# Patient Record
Sex: FEMALE | Race: WHITE | NOT HISPANIC OR LATINO | Employment: FULL TIME | URBAN - METROPOLITAN AREA
[De-identification: names, ages, dates, MRNs, and addresses within clinical notes are randomized per-mention and may not be internally consistent; named-entity substitution may affect disease eponyms.]

---

## 2017-01-15 ENCOUNTER — HOSPITAL ENCOUNTER (EMERGENCY)
Facility: HOSPITAL | Age: 37
Discharge: HOME | End: 2017-01-15
Attending: EMERGENCY MEDICINE | Admitting: EMERGENCY MEDICINE

## 2017-01-15 ENCOUNTER — APPOINTMENT (OUTPATIENT)
Dept: GENERAL RADIOLOGY | Facility: HOSPITAL | Age: 37
End: 2017-01-15

## 2017-01-15 VITALS
HEIGHT: 60 IN | HEART RATE: 70 BPM | RESPIRATION RATE: 18 BRPM | BODY MASS INDEX: 31.41 KG/M2 | TEMPERATURE: 98.1 F | WEIGHT: 160 LBS | DIASTOLIC BLOOD PRESSURE: 70 MMHG | SYSTOLIC BLOOD PRESSURE: 116 MMHG | OXYGEN SATURATION: 98 %

## 2017-01-15 DIAGNOSIS — S80.02XA CONTUSION OF LEFT KNEE, INITIAL ENCOUNTER: Primary | ICD-10-CM

## 2017-01-15 PROCEDURE — 99283 EMERGENCY DEPT VISIT LOW MDM: CPT

## 2017-01-15 PROCEDURE — 99283 EMERGENCY DEPT VISIT LOW MDM: CPT | Performed by: EMERGENCY MEDICINE

## 2017-01-15 PROCEDURE — 73562 X-RAY EXAM OF KNEE 3: CPT

## 2017-01-15 RX ORDER — HYDROCODONE BITARTRATE AND ACETAMINOPHEN 5; 325 MG/1; MG/1
TABLET ORAL
Qty: 10 TABLET | Refills: 0 | Status: SHIPPED | OUTPATIENT
Start: 2017-01-15 | End: 2017-10-26

## 2017-01-15 RX ORDER — IBUPROFEN 800 MG/1
800 TABLET ORAL EVERY 6 HOURS PRN
COMMUNITY
End: 2017-10-26

## 2017-01-15 NOTE — DISCHARGE INSTRUCTIONS
Return to the emergency department with worsening symptoms, uncontrolled pain, inability to tolerate oral liquids, fever greater than 105°F not controlled by Tylenol and ibuprofen or as needed with emergent concerns.

## 2017-01-15 NOTE — ED PROVIDER NOTES
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  She reports that her left knee struck the ground.  No direct blunt force trauma otherwise.    Review of Systems  All systems reviewed and are otherwise negative as release chief complaint  Past Medical History   Diagnosis Date   • Migraine    • Strep throat        Allergies   Allergen Reactions   • Imitrex [Sumatriptan] Shortness Of Breath   • Latex Hives       Past Surgical History   Procedure Laterality Date   •  section     • Tubal abdominal ligation         Family History   Problem Relation Age of Onset   • COPD Maternal Grandmother    • Heart attack Maternal Grandfather        Social History     Social History   • Marital status:      Spouse name: N/A   • Number of children: N/A   • Years of education: N/A     Social History Main Topics   • Smoking status: Never Smoker   • Smokeless tobacco: None   • Alcohol use Yes      Comment: socially   • Drug use: None   • Sexual activity: Yes     Partners: Male      Comment: tubal ligation      Other Topics Concern   • None     Social History Narrative   • None       ED Triage Vitals   Temp Heart Rate Resp BP SpO2   01/15/17 1217 01/15/17 1217 01/15/17 1217 01/15/17 1217 01/15/17 1217   98.1 °F (36.7 °C) 70 18 116/70 98 %      Temp src Heart Rate Source Patient Position BP Location FiO2 (%)   01/15/17 1217 -- 01/15/17 1217 01/15/17 1217 --   Oral  Sitting Right arm      Objective   Physical Exam   Constitutional: She is oriented to person, place, and time. She appears well-developed. No distress.   HENT:   Head: Normocephalic and atraumatic.   Neck:    Painless movement   Cardiovascular: Normal rate and regular rhythm.    Pulmonary/Chest: Effort normal and breath sounds normal. No respiratory distress.   Musculoskeletal:        Legs:  MAEE, normal strength   Neurological: She is alert and oriented to person, place, and time.   Skin: Skin is warm and dry.   Psychiatric: She has a normal mood and affect. Thought content normal.   Nursing note and vitals reviewed.      Procedures  RADIOLOGY        Study: XR Left KNee    Findings: no fx or dislocation    Interpreted Contemporaneously by myself, independently viewed by me.    SPLINT: ACE to left knee. Neurovascularly unchanged after.       ED Course  ED Course                MDM  Number of Diagnoses or Management Options  Contusion of left knee, initial encounter:      Amount and/or Complexity of Data Reviewed  Tests in the radiology section of CPT®: ordered and reviewed  Independent visualization of images, tracings, or specimens: (MASON)      Mason query complete. Treatment plan to include limited course of prescribed controlled substance.  Risks including addiction, tolerance, sedation, benefits and alternatives presented to patient.  Final diagnoses:   Contusion of left knee, initial encounter              Medication List      New Prescriptions          HYDROcodone-acetaminophen 5-325 MG per tablet   Commonly known as:  NORCO   Take 1 tab by mouth every 4-6 hours as needed for pain         Stop          predniSONE 10 MG tablet   Commonly known as:  OTILIA Ricci MD  01/15/17 6559

## 2017-01-15 NOTE — ED NOTES
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  C/o left leg/knee pain     Sina Mtz RN  01/15/17 1518

## 2017-04-12 ENCOUNTER — APPOINTMENT (OUTPATIENT)
Dept: GENERAL RADIOLOGY | Facility: HOSPITAL | Age: 37
End: 2017-04-12

## 2017-04-12 ENCOUNTER — HOSPITAL ENCOUNTER (EMERGENCY)
Facility: HOSPITAL | Age: 37
Discharge: HOME | End: 2017-04-12
Attending: EMERGENCY MEDICINE | Admitting: EMERGENCY MEDICINE

## 2017-04-12 VITALS
DIASTOLIC BLOOD PRESSURE: 88 MMHG | OXYGEN SATURATION: 100 % | RESPIRATION RATE: 16 BRPM | SYSTOLIC BLOOD PRESSURE: 128 MMHG | TEMPERATURE: 98.1 F | HEART RATE: 64 BPM | HEIGHT: 61 IN | WEIGHT: 165 LBS | BODY MASS INDEX: 31.15 KG/M2

## 2017-04-12 DIAGNOSIS — S20.229A CONTUSION, BACK, UNSPECIFIED LATERALITY, INITIAL ENCOUNTER: Primary | ICD-10-CM

## 2017-04-12 PROCEDURE — 72072 X-RAY EXAM THORAC SPINE 3VWS: CPT

## 2017-04-12 PROCEDURE — 99283 EMERGENCY DEPT VISIT LOW MDM: CPT

## 2017-04-12 PROCEDURE — 72100 X-RAY EXAM L-S SPINE 2/3 VWS: CPT

## 2017-04-12 PROCEDURE — 99283 EMERGENCY DEPT VISIT LOW MDM: CPT | Performed by: EMERGENCY MEDICINE

## 2017-04-12 PROCEDURE — 25010000002 KETOROLAC TROMETHAMINE PER 15 MG: Performed by: EMERGENCY MEDICINE

## 2017-04-12 PROCEDURE — 96372 THER/PROPH/DIAG INJ SC/IM: CPT

## 2017-04-12 RX ORDER — KETOROLAC TROMETHAMINE 30 MG/ML
60 INJECTION, SOLUTION INTRAMUSCULAR; INTRAVENOUS ONCE
Status: COMPLETED | OUTPATIENT
Start: 2017-04-12 | End: 2017-04-12

## 2017-04-12 RX ORDER — METHOCARBAMOL 750 MG/1
750 TABLET, FILM COATED ORAL 3 TIMES DAILY PRN
Qty: 42 TABLET | Refills: 0 | Status: SHIPPED | OUTPATIENT
Start: 2017-04-12 | End: 2017-04-19

## 2017-04-12 RX ORDER — NABUMETONE 750 MG/1
750 TABLET, FILM COATED ORAL 2 TIMES DAILY PRN
Qty: 14 TABLET | Refills: 0 | Status: SHIPPED | OUTPATIENT
Start: 2017-04-12 | End: 2017-04-19

## 2017-04-12 RX ADMIN — KETOROLAC TROMETHAMINE 60 MG: 30 INJECTION, SOLUTION INTRAMUSCULAR at 21:57
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
## 2017-04-13 NOTE — ED NOTES
Apologized to pt for the long wait. 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     Chai Marroquin RN  04/12/17 2078

## 2017-06-30 ENCOUNTER — HOSPITAL ENCOUNTER (EMERGENCY)
Facility: HOSPITAL | Age: 37
Discharge: HOME | End: 2017-06-30
Attending: EMERGENCY MEDICINE | Admitting: EMERGENCY MEDICINE

## 2017-06-30 VITALS
OXYGEN SATURATION: 100 % | BODY MASS INDEX: 32.98 KG/M2 | HEART RATE: 74 BPM | RESPIRATION RATE: 16 BRPM | HEIGHT: 60 IN | TEMPERATURE: 98.5 F | DIASTOLIC BLOOD PRESSURE: 89 MMHG | WEIGHT: 168 LBS | SYSTOLIC BLOOD PRESSURE: 133 MMHG

## 2017-06-30 DIAGNOSIS — S70.361A INSECT BITE (NONVENOMOUS), RIGHT THIGH, INITIAL ENCOUNTER: Primary | ICD-10-CM

## 2017-06-30 DIAGNOSIS — L03.115 CELLULITIS OF RIGHT LOWER EXTREMITY: ICD-10-CM
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
## 2017-06-30 PROCEDURE — 99282 EMERGENCY DEPT VISIT SF MDM: CPT | Performed by: EMERGENCY MEDICINE

## 2017-06-30 PROCEDURE — 99283 EMERGENCY DEPT VISIT LOW MDM: CPT

## 2017-06-30 RX ORDER — NAPROXEN 500 MG/1
500 TABLET ORAL 2 TIMES DAILY PRN
Qty: 30 TABLET | Refills: 0 | Status: SHIPPED | OUTPATIENT
Start: 2017-06-30 | End: 2017-10-26

## 2017-06-30 RX ORDER — NAPROXEN 250 MG/1
500 TABLET ORAL ONCE
Status: COMPLETED | OUTPATIENT
Start: 2017-06-30 | End: 2017-06-30

## 2017-06-30 RX ORDER — SULFAMETHOXAZOLE AND TRIMETHOPRIM 800; 160 MG/1; MG/1
1 TABLET ORAL EVERY 12 HOURS SCHEDULED
Status: DISCONTINUED | OUTPATIENT
Start: 2017-06-30 | End: 2017-07-01 | Stop reason: HOSPADM

## 2017-06-30 RX ORDER — SULFAMETHOXAZOLE AND TRIMETHOPRIM 800; 160 MG/1; MG/1
1 TABLET ORAL 2 TIMES DAILY
Qty: 20 TABLET | Refills: 0 | Status: SHIPPED | OUTPATIENT
Start: 2017-06-30 | End: 2017-10-26

## 2017-06-30 RX ADMIN — NAPROXEN 500 MG: 250 TABLET ORAL at 23:33

## 2017-06-30 RX ADMIN — SULFAMETHOXAZOLE AND TRIMETHOPRIM 160 MG: 800; 160 TABLET ORAL at 23:32

## 2017-07-01 NOTE — DISCHARGE INSTRUCTIONS
Ice right eye for 20 minutes every 2 hours while you're awake for 2-3 days.  Follow-up with Dr. kamara for recheck on Monday.  Return to emergency department if you have increasing pain, redness, fever, numbness, tingling, weakness, worse in any way at all

## 2017-07-01 NOTE — ED PROVIDER NOTES
Subjective   History of Present Illness  History of Present Illness    Chief complaint: Spider bite    Location: Right leg    Quality/Severity:  Red and tender    Timing/Onset/Duration: Gradual onset since yesterday    Modifying Factors: Nothing seems to make it better, time is made it worse    Associated Symptoms: Is no fever or chills.  There is no nausea or vomiting.  There is no numbness, tingling, or weakness.    Narrative: This 37-year-old white female presents with a red area on the anterior aspect of the right thigh after being bit by a spider yesterday.  The redness is increasing in size.  There is no fever.  There is no chills.  There is no numbness, tingling, weakness, or change in bladder or bowel function.  The patient's tetanus status is up-to-date.    PCP: Lauren      Review of Systems   Constitutional: Negative for chills and fever.   Skin: Positive for rash ( anterior aspect of the right thigh).   Neurological: Negative for weakness and numbness.        Medication List      ASK your doctor about these medications          albuterol 108 (90 BASE) MCG/ACT inhaler   Commonly known as:  PROVENTIL HFA;VENTOLIN HFA   Inhale 2 puffs every 4 (four) hours as needed for wheezing.       HYDROcodone-acetaminophen 5-325 MG per tablet   Commonly known as:  NORCO   Take 1 tab by mouth every 4-6 hours as needed for pain       ibuprofen 800 MG tablet   Commonly known as:  ADVIL,MOTRIN       predniSONE 10 MG tablet   Commonly known as:  DELTASONE   10 mg pack with package instructions           Past Medical History:   Diagnosis Date   • Migraine    • Strep throat        Allergies   Allergen Reactions   • Imitrex [Sumatriptan] Shortness Of Breath   • Latex Hives       Past Surgical History:   Procedure Laterality Date   •  SECTION     • TUBAL ABDOMINAL LIGATION         Family History   Problem Relation Age of Onset   • COPD Maternal Grandmother    • Heart attack Maternal Grandfather        Social History      Social History   • Marital status:      Spouse name: N/A   • Number of children: N/A   • Years of education: N/A     Social History Main Topics   • Smoking status: Never Smoker   • Smokeless tobacco: None   • Alcohol use Yes      Comment: socially   • Drug use: No   • Sexual activity: Yes     Partners: Male      Comment: tubal ligation      Other Topics Concern   • None     Social History Narrative           Objective   Physical Exam   Constitutional: She is oriented to person, place, and time. She appears well-developed and well-nourished. No distress.   ED Triage Vitals:  Temp: 98.5 °F (36.9 °C) (06/30/17 2207)  Heart Rate: 74 (06/30/17 2207)  Resp: 16 (06/30/17 2207)  BP: 133/89 (06/30/17 2207)  SpO2: 100 % (06/30/17 2207)  Temp src: Oral (06/30/17 2207)  Heart Rate Source: n/a  Patient Position: n/a  BP Location: n/a  FiO2 (%): n/a    The patient's vitals were reviewed by me.  Unless otherwise noted they are within normal limits.     Musculoskeletal:   There is redness on the anterior aspect of the right thigh.  The capillary refill is less than 2 seconds.  The sensation is intact.  There is a normal range of motion noted.  There is no joint laxity noted.  There is 2+ to cells pedis and posterior tibial pulse.   Neurological: She is oriented to person, place, and time.   Skin: Skin is warm and dry. No rash noted. There is erythema (anterior aspect right thigh).   Psychiatric: Her behavior is normal.   Nursing note and vitals reviewed.      Procedures         ED Course  ED Course                  MDM  No orders to display     Labs Reviewed - No data to display  No results found.    Final diagnoses:   None         ED Medications:  Medications   sulfamethoxazole-trimethoprim (BACTRIM DS,SEPTRA DS) 800-160 MG per tablet 160 mg (not administered)   naproxen (NAPROSYN) tablet 500 mg (not administered)       New Medications:     Medication List      ASK your doctor about these medications          albuterol  108 (90 BASE) MCG/ACT inhaler   Commonly known as:  PROVENTIL HFA;VENTOLIN HFA   Inhale 2 puffs every 4 (four) hours as needed for wheezing.       HYDROcodone-acetaminophen 5-325 MG per tablet   Commonly known as:  NORCO   Take 1 tab by mouth every 4-6 hours as needed for pain       ibuprofen 800 MG tablet   Commonly known as:  ADVIL,MOTRIN       predniSONE 10 MG tablet   Commonly known as:  DELTASONE   10 mg pack with package instructions           Stopped Medications:     Medication List      ASK your doctor about these medications          albuterol 108 (90 BASE) MCG/ACT inhaler   Commonly known as:  PROVENTIL HFA;VENTOLIN HFA   Inhale 2 puffs every 4 (four) hours as needed for wheezing.       HYDROcodone-acetaminophen 5-325 MG per tablet   Commonly known as:  NORCO   Take 1 tab by mouth every 4-6 hours as needed for pain       ibuprofen 800 MG tablet   Commonly known as:  ADVIL,MOTRIN       predniSONE 10 MG tablet   Commonly known as:  DELTASONE   10 mg pack with package instructions             Final diagnoses:   Insect bite (nonvenomous), right thigh, initial encounter   Cellulitis of right lower extremity            Efrain Guzman MD  06/30/17 2340       Efrain Guzman MD  07/07/17 1102       Efrain Guzman MD  07/07/17 1103

## 2017-08-24 ENCOUNTER — OFFICE VISIT (OUTPATIENT)
Dept: RETAIL CLINIC | Facility: CLINIC | Age: 37
End: 2017-08-24

## 2017-08-24 VITALS
HEART RATE: 82 BPM | DIASTOLIC BLOOD PRESSURE: 80 MMHG | SYSTOLIC BLOOD PRESSURE: 120 MMHG | OXYGEN SATURATION: 98 % | RESPIRATION RATE: 18 BRPM | TEMPERATURE: 98 F

## 2017-08-24 DIAGNOSIS — J06.9 VIRAL UPPER RESPIRATORY TRACT INFECTION: ICD-10-CM

## 2017-08-24 DIAGNOSIS — J02.9 SORE THROAT: ICD-10-CM

## 2017-08-24 DIAGNOSIS — A08.4 VIRAL GASTROENTERITIS: Primary | ICD-10-CM

## 2017-08-24 PROBLEM — R09.81 NASAL CONGESTION: Status: RESOLVED | Noted: 2017-08-24 | Resolved: 2017-08-24

## 2017-08-24 PROBLEM — R11.2 NAUSEA & VOMITING: Status: ACTIVE | Noted: 2017-08-24

## 2017-08-24 PROBLEM — R09.81 NASAL CONGESTION: Status: ACTIVE | Noted: 2017-08-24

## 2017-08-24 LAB
EXPIRATION DATE: NORMAL
INTERNAL CONTROL: NORMAL
Lab: NORMAL
S PYO AG THROAT QL: NEGATIVE

## 2017-08-24 PROCEDURE — 87880 STREP A ASSAY W/OPTIC: CPT | Performed by: NURSE PRACTITIONER

## 2017-08-24 PROCEDURE — 99213 OFFICE O/P EST LOW 20 MIN: CPT | Performed by: NURSE PRACTITIONER

## 2017-08-24 RX ORDER — ONDANSETRON 4 MG/1
4 TABLET, FILM COATED ORAL EVERY 8 HOURS PRN
Qty: 12 TABLET | Refills: 0 | Status: SHIPPED | OUTPATIENT
Start: 2017-08-24 | End: 2017-10-26

## 2017-08-24 NOTE — PATIENT INSTRUCTIONS
Viral Gastroenteritis, Adult  Viral gastroenteritis is also known as the stomach flu. This condition is caused by various viruses. These viruses can be passed from person to person very easily (are very contagious). This condition may affect your stomach, small intestine, and large intestine. It can cause sudden watery diarrhea, fever, and vomiting.  Diarrhea and vomiting can make you feel weak and cause you to become dehydrated. You may not be able to keep fluids down. Dehydration can make you tired and thirsty, cause you to have a dry mouth, and decrease how often you urinate. Older adults and people with other diseases or a weak immune system are at higher risk for dehydration.  It is important to replace the fluids that you lose from diarrhea and vomiting. If you become severely dehydrated, you may need to get fluids through an IV tube.  CAUSES  Gastroenteritis is caused by various viruses, including rotavirus and norovirus. Norovirus is the most common cause in adults.  You can get sick by eating food, drinking water, or touching a surface contaminated with one of these viruses. You can also get sick from sharing utensils or other personal items with an infected person.  RISK FACTORS  This condition is more likely to develop in people:  · Who have a weak defense system (immune system).  · Who live with one or more children who are younger than 2 years old.  · Who live in a nursing home.  · Who go on cruise ships.  SYMPTOMS  Symptoms of this condition start suddenly 1-2 days after exposure to a virus. Symptoms may last a few days or as long as a week. The most common symptoms are watery diarrhea and vomiting. Other symptoms include:  · Fever.  · Headache.  · Fatigue.  · Pain in the abdomen.  · Chills.  · Weakness.  · Nausea.  · Muscle aches.  · Loss of appetite.  DIAGNOSIS  This condition is diagnosed with a medical history and physical exam. You may also have a stool test to check for viruses or other  infections.  TREATMENT  This condition typically goes away on its own. The focus of treatment is to restore lost fluids (rehydration). Your health care provider may recommend that you take an oral rehydration solution (ORS) to replace important salts and minerals (electrolytes) in your body. Severe cases of this condition may require giving fluids through an IV tube.  Treatment may also include medicine to help with your symptoms.  HOME CARE INSTRUCTIONS  Follow instructions from your health care provider about how to care for yourself at home.  Eating and Drinking  Follow these recommendations as told by your health care provider:  · Take an ORS. This is a drink that is sold at pharmacies and retail stores.  · Drink clear fluids in small amounts as you are able. Clear fluids include water, ice chips, diluted fruit juice, and low-calorie sports drinks.  · Eat bland, easy-to-digest foods in small amounts as you are able. These foods include bananas, applesauce, rice, lean meats, toast, and crackers.  · Avoid fluids that contain a lot of sugar or caffeine, such as energy drinks, sports drinks, and soda.  · Avoid alcohol.  · Avoid spicy or fatty foods.  General Instructions  · Drink enough fluid to keep your urine clear or pale yellow.  · Wash your hands often. If soap and water are not available, use hand .  · Make sure that all people in your household wash their hands well and often.  · Take over-the-counter and prescription medicines only as told by your health care provider.  · Rest at home while you recover.  · Watch your condition for any changes.  · Take a warm bath to relieve any burning or pain from frequent diarrhea episodes.  · Keep all follow-up visits as told by your health care provider. This is important.  SEEK MEDICAL CARE IF:  · You cannot keep fluids down.  · Your symptoms get worse.  · You have new symptoms.  · You feel light-headed or dizzy.  · You have muscle cramps.  SEEK IMMEDIATE  "MEDICAL CARE IF:  · You have chest pain.  · You feel extremely weak or you faint.  · You see blood in your vomit.  · Your vomit looks like coffee grounds.  · You have bloody or black stools or stools that look like tar.  · You have a severe headache, a stiff neck, or both.  · You have a rash.  · You have severe pain, cramping, or bloating in your abdomen.  · You have trouble breathing or you are breathing very quickly.  · Your heart is beating very quickly.  · Your skin feels cold and clammy.  · You feel confused.  · You have pain when you urinate.  · You have signs of dehydration, such as:    Dark urine, very little urine, or no urine.    Cracked lips.    Dry mouth.    Sunken eyes.    Sleepiness.    Weakness.     This information is not intended to replace advice given to you by your health care provider. Make sure you discuss any questions you have with your health care provider.     Document Released: 12/18/2006 Document Revised: 04/10/2017 Document Reviewed: 08/23/2016  [employer] Interactive Patient Education ©2017 [employer]    Upper Respiratory Infection, Adult  Most upper respiratory infections (URIs) are a viral infection of the air passages leading to the lungs. A URI affects the nose, throat, and upper air passages. The most common type of URI is nasopharyngitis and is typically referred to as \"the common cold.\"  URIs run their course and usually go away on their own. Most of the time, a URI does not require medical attention, but sometimes a bacterial infection in the upper airways can follow a viral infection. This is called a secondary infection. Sinus and middle ear infections are common types of secondary upper respiratory infections.  Bacterial pneumonia can also complicate a URI. A URI can worsen asthma and chronic obstructive pulmonary disease (COPD). Sometimes, these complications can require emergency medical care and may be life threatening.   CAUSES  Almost all URIs are caused by viruses. A " virus is a type of germ and can spread from one person to another.   RISKS FACTORS  You may be at risk for a URI if:   · You smoke.    · You have chronic heart or lung disease.  · You have a weakened defense (immune) system.    · You are very young or very old.    · You have nasal allergies or asthma.  · You work in crowded or poorly ventilated areas.  · You work in health care facilities or schools.  SIGNS AND SYMPTOMS   Symptoms typically develop 2-3 days after you come in contact with a cold virus. Most viral URIs last 7-10 days. However, viral URIs from the influenza virus (flu virus) can last 14-18 days and are typically more severe. Symptoms may include:   · Runny or stuffy (congested) nose.    · Sneezing.    · Cough.    · Sore throat.    · Headache.    · Fatigue.    · Fever.    · Loss of appetite.    · Pain in your forehead, behind your eyes, and over your cheekbones (sinus pain).  · Muscle aches.    DIAGNOSIS   Your health care provider may diagnose a URI by:  · Physical exam.  · Tests to check that your symptoms are not due to another condition such as:  ¨ Strep throat.  ¨ Sinusitis.  ¨ Pneumonia.  ¨ Asthma.  TREATMENT   A URI goes away on its own with time. It cannot be cured with medicines, but medicines may be prescribed or recommended to relieve symptoms. Medicines may help:  · Reduce your fever.  · Reduce your cough.  · Relieve nasal congestion.  HOME CARE INSTRUCTIONS   · Take medicines only as directed by your health care provider.    · Gargle warm saltwater or take cough drops to comfort your throat as directed by your health care provider.  · Use a warm mist humidifier or inhale steam from a shower to increase air moisture. This may make it easier to breathe.  · Drink enough fluid to keep your urine clear or pale yellow.    · Eat soups and other clear broths and maintain good nutrition.    · Rest as needed.    · Return to work when your temperature has returned to normal or as your health care  provider advises. You may need to stay home longer to avoid infecting others. You can also use a face mask and careful hand washing to prevent spread of the virus.  · Increase the usage of your inhaler if you have asthma.    · Do not use any tobacco products, including cigarettes, chewing tobacco, or electronic cigarettes. If you need help quitting, ask your health care provider.  PREVENTION   The best way to protect yourself from getting a cold is to practice good hygiene.   · Avoid oral or hand contact with people with cold symptoms.    · Wash your hands often if contact occurs.    There is no clear evidence that vitamin C, vitamin E, echinacea, or exercise reduces the chance of developing a cold. However, it is always recommended to get plenty of rest, exercise, and practice good nutrition.   SEEK MEDICAL CARE IF:   · You are getting worse rather than better.    · Your symptoms are not controlled by medicine.    · You have chills.  · You have worsening shortness of breath.  · You have brown or red mucus.  · You have yellow or brown nasal discharge.  · You have pain in your face, especially when you bend forward.  · You have a fever.  · You have swollen neck glands.  · You have pain while swallowing.  · You have white areas in the back of your throat.  SEEK IMMEDIATE MEDICAL CARE IF:   · You have severe or persistent:    Headache.    Ear pain.    Sinus pain.    Chest pain.  · You have chronic lung disease and any of the following:    Wheezing.    Prolonged cough.    Coughing up blood.    A change in your usual mucus.  · You have a stiff neck.  · You have changes in your:    Vision.    Hearing.    Thinking.    Mood.  MAKE SURE YOU:   · Understand these instructions.  · Will watch your condition.  · Will get help right away if you are not doing well or get worse.     This information is not intended to replace advice given to you by your health care provider. Make sure you discuss any questions you have with your  health care provider.     Document Released: 06/13/2002 Document Revised: 05/03/2016 Document Reviewed: 03/25/2015  [employer] Interactive Patient Education ©2017 [employer]    Sore Throat  A sore throat is pain, burning, irritation, or scratchiness in the throat. When you have a sore throat, you may feel pain or tenderness in your throat when you swallow or talk.  Many things can cause a sore throat, including:  · An infection.  · Seasonal allergies.  · Dryness in the air.  · Irritants, such as smoke or pollution.  · Gastroesophageal reflux disease (GERD).  · A tumor.  A sore throat is often the first sign of another sickness. It may happen with other symptoms, such as coughing, sneezing, fever, and swollen neck glands. Most sore throats go away without medical treatment.  HOME CARE INSTRUCTIONS  · Take over-the-counter medicines only as told by your health care provider.  · Drink enough fluids to keep your urine clear or pale yellow.  · Rest as needed.  · To help with pain, try:    Sipping warm liquids, such as broth, herbal tea, or warm water.    Eating or drinking cold or frozen liquids, such as frozen ice pops.    Gargling with a salt-water mixture 3-4 times a day or as needed. To make a salt-water mixture, completely dissolve ½-1 tsp of salt in 1 cup of warm water.    Sucking on hard candy or throat lozenges.    Putting a cool-mist humidifier in your bedroom at night to moisten the air.    Sitting in the bathroom with the door closed for 5-10 minutes while you run hot water in the shower.  · Do not use any tobacco products, such as cigarettes, chewing tobacco, and e-cigarettes. If you need help quitting, ask your health care provider.  SEEK MEDICAL CARE IF:  · You have a fever for more than 2-3 days.  · You have symptoms that last (are persistent) for more than 2-3 days.  · Your throat does not get better within 7 days.  · You have a fever and your symptoms suddenly get worse.  SEEK IMMEDIATE MEDICAL CARE  IF:  · You have difficulty breathing.  · You cannot swallow fluids, soft foods, or your saliva.  · You have increased swelling in your throat or neck.  · You have persistent nausea and vomiting.     This information is not intended to replace advice given to you by your health care provider. Make sure you discuss any questions you have with your health care provider.     Document Released: 01/25/2006 Document Revised: 04/10/2017 Document Reviewed: 10/07/2016  [employer] Interactive Patient Education ©2017 [employer]  Talked to the patient about the diagnosis and educate the patient and advise to visit to PCP if the symptoms worsens

## 2017-09-23 ENCOUNTER — APPOINTMENT (OUTPATIENT)
Dept: GENERAL RADIOLOGY | Facility: HOSPITAL | Age: 37
End: 2017-09-23

## 2017-09-23 ENCOUNTER — HOSPITAL ENCOUNTER (EMERGENCY)
Facility: HOSPITAL | Age: 37
Discharge: HOME | End: 2017-09-23
Attending: EMERGENCY MEDICINE | Admitting: EMERGENCY MEDICINE

## 2017-09-23 VITALS
HEART RATE: 73 BPM | HEIGHT: 60 IN | BODY MASS INDEX: 32.39 KG/M2 | SYSTOLIC BLOOD PRESSURE: 125 MMHG | OXYGEN SATURATION: 99 % | WEIGHT: 165 LBS | RESPIRATION RATE: 14 BRPM | TEMPERATURE: 97.9 F | DIASTOLIC BLOOD PRESSURE: 91 MMHG

## 2017-09-23 DIAGNOSIS — S60.211A CONTUSION OF RIGHT WRIST, INITIAL ENCOUNTER: Primary | ICD-10-CM

## 2017-09-23 PROCEDURE — 99283 EMERGENCY DEPT VISIT LOW MDM: CPT

## 2017-09-23 PROCEDURE — 99284 EMERGENCY DEPT VISIT MOD MDM: CPT | Performed by: EMERGENCY MEDICINE

## 2017-09-23 PROCEDURE — 73110 X-RAY EXAM OF WRIST: CPT

## 2017-09-23 RX ORDER — IBUPROFEN 400 MG/1
400 TABLET ORAL ONCE
Status: COMPLETED | OUTPATIENT
Start: 2017-09-23 | End: 2017-09-23

## 2017-09-23 RX ADMIN — IBUPROFEN 400 MG: 400 TABLET ORAL at 02:45

## 2017-09-23 NOTE — DISCHARGE INSTRUCTIONS
Rest, ice, elevate as needed.  May take ibuprofen or aleve every 12 hours as needed.  Please return to the ER for any worsening pain, weakness, numbness, or any other concerns.

## 2017-09-23 NOTE — ED PROVIDER NOTES
Subjective   History of Present Illness  History of Present Illness    Chief complaint: Wrist pain    Location: Right wrist    Quality/Severity:  Moderate pain    Timing/Duration: 2 days, constant    Modifying Factors: Worse with movement of the wrist    Narrative: This patient presents for evaluation of wrist pain for the past couple days.  She says that 2 days ago she was reaching for something in the trunk of her car when the trunk accidentally slipped down and fell directly on her wrist area.  This caused some immediate pain and tenderness.  She is right-hand dominant.  She has been able to move her wrist but it hurts to do so.  She has been placing ice on the affected area but that does not seem to be helping her.  She denies any other areas of injury or concern.    Associated Symptoms: None    Review of Systems   Constitutional: Negative for activity change and fever.   HENT: Negative.    Respiratory: Negative for shortness of breath.    Cardiovascular: Negative for chest pain.   Gastrointestinal: Negative for abdominal pain.   Musculoskeletal: Positive for arthralgias. Negative for myalgias.   Skin: Negative for color change, rash and wound.   Neurological: Negative for syncope and headaches.   All other systems reviewed and are negative.      Past Medical History:   Diagnosis Date   • Migraine    • Migraines    • Strep throat        Allergies   Allergen Reactions   • Imitrex [Sumatriptan] Shortness Of Breath   • Latex Hives       Past Surgical History:   Procedure Laterality Date   •  SECTION     • TUBAL ABDOMINAL LIGATION         Family History   Problem Relation Age of Onset   • COPD Maternal Grandmother    • Heart attack Maternal Grandfather        Social History     Social History   • Marital status: Single     Spouse name: N/A   • Number of children: N/A   • Years of education: N/A     Social History Main Topics   • Smoking status: Never Smoker   • Smokeless tobacco: None   • Alcohol use Yes       Comment: socially   • Drug use: No   • Sexual activity: Yes     Partners: Male      Comment: tubal ligation      Other Topics Concern   • None     Social History Narrative       ED Triage Vitals   Temp Heart Rate Resp BP SpO2   09/23/17 0211 09/23/17 0211 09/23/17 0211 09/23/17 0211 09/23/17 0211   97.9 °F (36.6 °C) 73 14 135/106 99 %      Temp src Heart Rate Source Patient Position BP Location FiO2 (%)   -- 09/23/17 0211 09/23/17 0256 09/23/17 0256 --    Monitor Sitting Left arm          Objective   Physical Exam   Constitutional: She is oriented to person, place, and time. She appears well-developed and well-nourished. No distress.   HENT:   Head: Normocephalic and atraumatic.   Eyes: EOM are normal. Pupils are equal, round, and reactive to light. Right eye exhibits no discharge. Left eye exhibits no discharge.   Neck: Normal range of motion. Neck supple.   Cardiovascular: Normal rate and intact distal pulses.    Pulmonary/Chest: Effort normal. No respiratory distress.   Musculoskeletal: Normal range of motion. She exhibits tenderness. She exhibits no edema or deformity.   Mild right wrist tenderness along the distal radius aspect only.  No deformity.  Normal active ROM noted easily   Neurological: She is alert and oriented to person, place, and time.   Skin: Skin is warm and dry. No rash noted. She is not diaphoretic. No erythema.   Psychiatric: She has a normal mood and affect. Her behavior is normal. Judgment and thought content normal.   Nursing note and vitals reviewed.    RADIOLOGY        Study: X-ray right wrist    Findings: No fracture or dislocation    Interpreted contemporaneously with treatment by myself, independently viewed by me        Procedures         ED Course  ED Course   Comment By Time   I reviewed the x-ray which looks normal.  I advised the patient on rice therapy strategies.  She already has a Velcro wrist immobilizer that she's been using at home.  I encouraged her to continue to use that  as needed.  Discharged home in good condition after some ibuprofen was given to her here. Peng Bailey MD 09/23 0304                  MDM  Number of Diagnoses or Management Options  Contusion of right wrist, initial encounter:      Amount and/or Complexity of Data Reviewed  Tests in the radiology section of CPT®: reviewed and ordered  Independent visualization of images, tracings, or specimens: yes    Risk of Complications, Morbidity, and/or Mortality  Presenting problems: moderate  Diagnostic procedures: moderate  Management options: moderate        Final diagnoses:   Contusion of right wrist, initial encounter            Peng Bailey MD  09/23/17 0304

## 2017-09-26 ENCOUNTER — APPOINTMENT (OUTPATIENT)
Dept: GENERAL RADIOLOGY | Facility: HOSPITAL | Age: 37
End: 2017-09-26

## 2017-09-26 ENCOUNTER — HOSPITAL ENCOUNTER (EMERGENCY)
Facility: HOSPITAL | Age: 37
Discharge: HOME | End: 2017-09-26
Admitting: PHYSICIAN ASSISTANT

## 2017-09-26 VITALS
SYSTOLIC BLOOD PRESSURE: 122 MMHG | TEMPERATURE: 98.3 F | HEIGHT: 60 IN | HEART RATE: 72 BPM | RESPIRATION RATE: 16 BRPM | OXYGEN SATURATION: 97 % | BODY MASS INDEX: 33.38 KG/M2 | DIASTOLIC BLOOD PRESSURE: 83 MMHG | WEIGHT: 170 LBS

## 2017-09-26 DIAGNOSIS — S80.02XA CONTUSION OF LEFT KNEE, INITIAL ENCOUNTER: Primary | ICD-10-CM

## 2017-09-26 PROCEDURE — 73560 X-RAY EXAM OF KNEE 1 OR 2: CPT

## 2017-09-26 PROCEDURE — 99284 EMERGENCY DEPT VISIT MOD MDM: CPT | Performed by: PHYSICIAN ASSISTANT

## 2017-09-26 PROCEDURE — 99283 EMERGENCY DEPT VISIT LOW MDM: CPT

## 2017-10-26 ENCOUNTER — OFFICE VISIT (OUTPATIENT)
Dept: RETAIL CLINIC | Facility: CLINIC | Age: 37
End: 2017-10-26

## 2017-10-26 VITALS
HEART RATE: 65 BPM | OXYGEN SATURATION: 98 % | SYSTOLIC BLOOD PRESSURE: 108 MMHG | TEMPERATURE: 98 F | RESPIRATION RATE: 16 BRPM | DIASTOLIC BLOOD PRESSURE: 70 MMHG

## 2017-10-26 DIAGNOSIS — R53.83 OTHER FATIGUE: Primary | ICD-10-CM

## 2017-10-26 DIAGNOSIS — H66.003 ACUTE SUPPURATIVE OTITIS MEDIA OF BOTH EARS WITHOUT SPONTANEOUS RUPTURE OF TYMPANIC MEMBRANES, RECURRENCE NOT SPECIFIED: ICD-10-CM

## 2017-10-26 DIAGNOSIS — J02.9 SORE THROAT: ICD-10-CM

## 2017-10-26 LAB
EXPIRATION DATE: NORMAL
EXPIRATION DATE: NORMAL
FLUAV AG NPH QL: NORMAL
FLUBV AG NPH QL: NORMAL
INTERNAL CONTROL: NORMAL
INTERNAL CONTROL: NORMAL
Lab: NORMAL
Lab: NORMAL
S PYO AG THROAT QL: NEGATIVE

## 2017-10-26 PROCEDURE — 87804 INFLUENZA ASSAY W/OPTIC: CPT | Performed by: NURSE PRACTITIONER

## 2017-10-26 PROCEDURE — 99213 OFFICE O/P EST LOW 20 MIN: CPT | Performed by: NURSE PRACTITIONER

## 2017-10-26 PROCEDURE — 87880 STREP A ASSAY W/OPTIC: CPT | Performed by: NURSE PRACTITIONER

## 2017-10-26 RX ORDER — AMOXICILLIN 875 MG/1
875 TABLET, COATED ORAL 2 TIMES DAILY
Qty: 20 TABLET | Refills: 0 | Status: SHIPPED | OUTPATIENT
Start: 2017-10-26 | End: 2017-11-05

## 2017-10-26 NOTE — PROGRESS NOTES
Subjective   Latonya Lemus is a 37 y.o. female.     Headache    This is a new problem. The current episode started yesterday. The problem has been unchanged. The pain is located in the bilateral region. The pain quality is similar to prior headaches. The quality of the pain is described as aching. Associated symptoms include ear pain and a sore throat. Pertinent negatives include no coughing, dizziness, fever, nausea or vomiting. She has tried acetaminophen for the symptoms. The treatment provided no relief. Her past medical history is significant for migraine headaches.   Sore Throat    This is a new problem. The current episode started yesterday. The problem has been unchanged. There has been no fever. The pain is mild. Associated symptoms include ear pain, headaches and a plugged ear sensation. Pertinent negatives include no coughing, shortness of breath or vomiting. She has had exposure to strep. She has tried acetaminophen for the symptoms. The treatment provided mild relief.   Ear Fullness    There is pain in both ears. The current episode started in the past 7 days. The problem occurs constantly. There has been no fever. Associated symptoms include headaches and a sore throat. Pertinent negatives include no coughing or vomiting. She has tried acetaminophen for the symptoms. The treatment provided mild relief.   Chest Pain    This is a new problem. The onset quality is sudden. Episode frequency: once. The pain is present in the epigastric region. The quality of the pain is described as burning. The pain does not radiate. Associated symptoms include headaches. Pertinent negatives include no cough, dizziness, exertional chest pressure, fever, nausea, shortness of breath or vomiting. She has tried acetaminophen for the symptoms. The treatment provided no relief.   Pertinent negatives for past medical history include no CAD.   Her family medical history is significant for CAD. Family history comments: grandfater   at age 61 heart attack        The following portions of the patient's history were reviewed and updated as appropriate: allergies, current medications, past family history, past medical history, past social history, past surgical history and problem list.    Review of Systems   Constitutional: Negative.  Negative for chills and fever.   HENT: Positive for ear pain and sore throat.    Eyes: Negative.    Respiratory: Negative.  Negative for cough and shortness of breath.    Cardiovascular: Positive for chest pain.        Reports had one episode of burning in the epigastric area. Reports this as chest pain. Denies any radiation    Gastrointestinal: Negative.  Negative for nausea and vomiting.   Endocrine: Negative.    Genitourinary: Negative.    Musculoskeletal: Negative.    Skin: Negative.    Allergic/Immunologic: Negative.    Neurological: Positive for headaches. Negative for dizziness.   Hematological: Negative.    Psychiatric/Behavioral: Negative.        Objective   Physical Exam   Constitutional: She is oriented to person, place, and time. Vital signs are normal. She appears well-developed and well-nourished.   HENT:   Head: Normocephalic and atraumatic.   Right Ear: Hearing, external ear and ear canal normal. Tympanic membrane is erythematous.   Left Ear: Hearing, external ear and ear canal normal. Tympanic membrane is erythematous.   Nose: Nose normal. Right sinus exhibits no maxillary sinus tenderness and no frontal sinus tenderness. Left sinus exhibits no maxillary sinus tenderness and no frontal sinus tenderness.   Mouth/Throat: Uvula is midline and mucous membranes are normal. Posterior oropharyngeal erythema present. No tonsillar exudate.   Eyes: Conjunctivae and lids are normal. Pupils are equal, round, and reactive to light.   Neck: Trachea normal and normal range of motion. Neck supple.   Cardiovascular: Normal rate, regular rhythm, S1 normal, S2 normal and normal heart sounds.    Pulmonary/Chest: Effort  normal and breath sounds normal. She exhibits no tenderness.   Abdominal: Soft. Normal appearance and bowel sounds are normal. There is no tenderness.   Musculoskeletal: Normal range of motion.   Lymphadenopathy:     She has no cervical adenopathy.   Neurological: She is alert and oriented to person, place, and time.   Skin: Skin is warm, dry and intact. No rash noted.   Psychiatric: She has a normal mood and affect. Her speech is normal and behavior is normal.   Vitals reviewed.      Assessment/Plan   Problems Addressed this Visit        Respiratory    Sore throat      Other Visit Diagnoses     Other fatigue    -  Primary    Relevant Orders    POC Influenza A / B (Completed)    POC Rapid Strep A (Completed)    Acute suppurative otitis media of both ears without spontaneous rupture of tympanic membranes, recurrence not specified            Amoxicillin 875 mg po bid x 10 days  Pt declines to go to ER for chest pain episode that she described as a burning sensation in epigastric area. Pt reports if symptoms return she will go to ER

## 2017-10-26 NOTE — PATIENT INSTRUCTIONS
"Advised pt to go to ER. Declines. Reports if symptoms return will go to ER. Understand women present differently with MI jaw pain, epigastric burning etc. Understands the risk and consequences of not going to ER which includes death. Reports she is \"hard headed\".   "

## 2020-08-06 NOTE — PROGRESS NOTES
Personalized Preventive Plan for Michael Ng - 8/6/2020  Medicare offers a range of preventive health benefits. Some of the tests and screenings are paid in full while other may be subject to a deductible, co-insurance, and/or copay. Some of these benefits include a comprehensive review of your medical history including lifestyle, illnesses that may run in your family, and various assessments and screenings as appropriate. After reviewing your medical record and screening and assessments performed today your provider may have ordered immunizations, labs, imaging, and/or referrals for you. A list of these orders (if applicable) as well as your Preventive Care list are included within your After Visit Summary for your review. Other Preventive Recommendations:    · A preventive eye exam performed by an eye specialist is recommended every 1-2 years to screen for glaucoma; cataracts, macular degeneration, and other eye disorders. · A preventive dental visit is recommended every 6 months. · Try to get at least 150 minutes of exercise per week or 10,000 steps per day on a pedometer . · Order or download the FREE \"Exercise & Physical Activity: Your Everyday Guide\" from [employer]. Call 5-182.554.5337 or search [employer] online. · You need 5464-9937 mg of calcium and 5200-6834 IU of vitamin D per day. It is possible to meet your calcium requirement with diet alone, but a vitamin D supplement is usually necessary to meet this goal.  · When exposed to the sun, use a sunscreen that protects against both UVA and UVB radiation with an SPF of 30 or greater. Reapply every 2 to 3 hours or after sweating, drying off with a towel, or swimming. · Always wear a seat belt when traveling in a car. Always wear a helmet when riding a bicycle or motorcycle. Subjective   Latonya GONZALEZ is a 37 y.o. female.     Sore Throat    This is a new problem. The current episode started yesterday. There has been no fever. The pain is at a severity of 3/10. The pain is mild. Associated symptoms include diarrhea and vomiting. Pertinent negatives include no shortness of breath, swollen glands or trouble swallowing. She has tried acetaminophen for the symptoms. The treatment provided mild relief.   Nausea   This is a new problem. The current episode started yesterday. The problem has been gradually worsening. Associated symptoms include nausea, a sore throat and vomiting. Pertinent negatives include no fever or swollen glands. Nothing aggravates the symptoms. She has tried acetaminophen for the symptoms. The treatment provided mild relief.        The following portions of the patient's history were reviewed and updated as appropriate: allergies, current medications, past family history, past medical history, past social history, past surgical history and problem list.    Review of Systems   Constitutional: Negative.  Negative for fever.   HENT: Positive for postnasal drip and sore throat. Negative for trouble swallowing.    Eyes: Negative.    Respiratory: Negative.  Negative for shortness of breath.    Gastrointestinal: Positive for diarrhea, nausea and vomiting.       Objective   Physical Exam   Constitutional: She appears well-developed.   HENT:   Head: Normocephalic and atraumatic.   Right Ear: External ear normal.   Left Ear: External ear normal.   Mouth/Throat: Posterior oropharyngeal erythema present. No oropharyngeal exudate, posterior oropharyngeal edema or tonsillar abscesses.   Eyes: Pupils are equal, round, and reactive to light.   Cardiovascular: Normal rate, regular rhythm and normal heart sounds.    Pulmonary/Chest: Effort normal and breath sounds normal. No respiratory distress. She has no decreased breath sounds. She has no wheezes. She has no rhonchi. She has no rales. She  exhibits no tenderness.   Abdominal: Soft. Bowel sounds are normal. There is tenderness in the epigastric area.   Mild epigastric    Nursing note and vitals reviewed.      Assessment/Plan   Latonya was seen today for sore throat and nausea.    Diagnoses and all orders for this visit:    Viral gastroenteritis  -     ondansetron (ZOFRAN) 4 MG tablet; Take 1 tablet by mouth Every 8 (Eight) Hours As Needed for Nausea or Vomiting.    Sore throat  -     POC Rapid Strep A    Viral upper respiratory tract infection  -     Chlorcyclizine-Pseudoephed (STAHIST AD) 25-60 MG tablet; Take 1 tablet by mouth 2 (Two) Times a Day for 7 days.      Talked to the patient about the diagnosis and educate the patient and advise to visit to PCP if the symptoms worsens

## 2023-04-12 ENCOUNTER — HOSPITAL ENCOUNTER (EMERGENCY)
Facility: HOSPITAL | Age: 43
Discharge: HOME | End: 2023-04-12
Attending: EMERGENCY MEDICINE | Admitting: EMERGENCY MEDICINE
Payer: MEDICAID

## 2023-04-12 ENCOUNTER — APPOINTMENT (OUTPATIENT)
Dept: GENERAL RADIOLOGY | Facility: HOSPITAL | Age: 43
End: 2023-04-12
Payer: MEDICAID

## 2023-04-12 VITALS
HEART RATE: 61 BPM | BODY MASS INDEX: 33.99 KG/M2 | SYSTOLIC BLOOD PRESSURE: 131 MMHG | OXYGEN SATURATION: 97 % | DIASTOLIC BLOOD PRESSURE: 72 MMHG | WEIGHT: 180 LBS | TEMPERATURE: 98.2 F | HEIGHT: 61 IN | RESPIRATION RATE: 16 BRPM

## 2023-04-12 DIAGNOSIS — J18.9 PNEUMONIA OF LEFT LOWER LOBE DUE TO INFECTIOUS ORGANISM: Primary | ICD-10-CM

## 2023-04-12 LAB
FLUAV SUBTYP SPEC NAA+PROBE: NOT DETECTED
FLUBV RNA ISLT QL NAA+PROBE: NOT DETECTED
SARS-COV-2 RNA RESP QL NAA+PROBE: NOT DETECTED

## 2023-04-12 PROCEDURE — 99283 EMERGENCY DEPT VISIT LOW MDM: CPT

## 2023-04-12 PROCEDURE — C9803 HOPD COVID-19 SPEC COLLECT: HCPCS | Performed by: EMERGENCY MEDICINE

## 2023-04-12 PROCEDURE — 87636 SARSCOV2 & INF A&B AMP PRB: CPT | Performed by: EMERGENCY MEDICINE

## 2023-04-12 PROCEDURE — 71045 X-RAY EXAM CHEST 1 VIEW: CPT

## 2023-04-12 RX ORDER — AZITHROMYCIN 250 MG/1
500 TABLET, FILM COATED ORAL ONCE
Status: COMPLETED | OUTPATIENT
Start: 2023-04-12 | End: 2023-04-12

## 2023-04-12 RX ORDER — AZITHROMYCIN 250 MG/1
250 TABLET, FILM COATED ORAL DAILY
Qty: 4 TABLET | Refills: 0 | Status: SHIPPED | OUTPATIENT
Start: 2023-04-12

## 2023-04-12 RX ADMIN — AZITHROMYCIN DIHYDRATE 500 MG: 250 TABLET, FILM COATED ORAL at 03:30

## 2023-04-12 NOTE — Clinical Note
[facility]  [address]  [place] [address]    Latonya Lemus was seen and treated in our emergency department on 4/12/2023.  She may return to work on 04/13/2023.  Please excuse Latonya from work today she was seen in our emergency room for pneumonia.       Thank you for choosing [facility].    Jadiel Garcia DO

## 2023-04-12 NOTE — Clinical Note
[facility]  [address]  [place] [address]    Latonya Lemus was seen and treated in our emergency department on 4/12/2023.  She may return to work on 04/13/2023.  Please excuse Latonya from work today she was seen in our emergency room for pneumonia.       Thank you for choosing [facility].    Jadiel Garcia DO

## 2023-04-12 NOTE — FSED PROVIDER NOTE
Subjective   History of Present Illness  Patient reports 1 week of a cough, headache, congestion and subjective fevers.  She came in tonight because is not getting any better but states it is also not getting any worse.  She does have a history of migraines and states she thinks her headache is from congestion and coughing.  She has not tried to take any Tylenol or Motrin for headache and denies any vomiting, diarrhea, neck pain or UTI symptoms.  She also denies any sore throat, earache or abdominal pain.  She states only when she coughs she does have some chest pain that is diffuse in nature.        Review of Systems   Constitutional: Positive for fever. Negative for chills and diaphoresis.   HENT: Positive for congestion and rhinorrhea. Negative for sore throat and trouble swallowing.    Eyes: Negative for visual disturbance.   Respiratory: Positive for cough. Negative for shortness of breath.    Cardiovascular: Positive for chest pain. Negative for palpitations.   Gastrointestinal: Negative for abdominal pain, constipation, diarrhea, nausea and vomiting.   Genitourinary: Negative for dysuria, frequency, hematuria and urgency.   Skin: Negative for rash.   Neurological: Positive for headaches. Negative for weakness, light-headedness and numbness.   Psychiatric/Behavioral: Negative for confusion.       Past Medical History:   Diagnosis Date   • Migraine    • Migraines    • Strep throat        Allergies   Allergen Reactions   • Imitrex [Sumatriptan] Shortness Of Breath   • Latex Hives       Past Surgical History:   Procedure Laterality Date   •  SECTION      x 3     • TUBAL ABDOMINAL LIGATION         Family History   Problem Relation Age of Onset   • COPD Maternal Grandmother    • Heart attack Maternal Grandfather        Social History     Socioeconomic History   • Marital status: Single   Tobacco Use   • Smoking status: Never   • Smokeless tobacco: Never   Substance and Sexual Activity   • Alcohol use: Yes      Comment: socially   • Drug use: No   • Sexual activity: Yes     Partners: Male     Comment: tubal ligation            Objective   Physical Exam  Constitutional:       General: She is not in acute distress.     Appearance: She is well-developed. She is not diaphoretic.   HENT:      Head: Normocephalic and atraumatic.      Right Ear: Tympanic membrane normal.      Left Ear: Tympanic membrane normal.      Nose: Congestion present. No rhinorrhea.      Mouth/Throat:      Mouth: Mucous membranes are moist. No oral lesions.      Pharynx: No pharyngeal swelling, oropharyngeal exudate, posterior oropharyngeal erythema or uvula swelling.      Tonsils: No tonsillar exudate or tonsillar abscesses.   Eyes:      Conjunctiva/sclera: Conjunctivae normal.      Pupils: Pupils are equal, round, and reactive to light.   Neck:      Thyroid: No thyromegaly.      Vascular: No JVD.      Trachea: No tracheal deviation.   Cardiovascular:      Rate and Rhythm: Normal rate and regular rhythm.      Heart sounds: Normal heart sounds. No murmur heard.    No friction rub. No gallop.   Pulmonary:      Effort: Pulmonary effort is normal. No respiratory distress.      Breath sounds: Normal breath sounds. No stridor. No wheezing or rales.   Abdominal:      General: Bowel sounds are normal. There is no distension.      Palpations: Abdomen is soft. There is no mass.      Tenderness: There is no abdominal tenderness. There is no guarding or rebound.      Hernia: No hernia is present.   Musculoskeletal:         General: No tenderness or deformity. Normal range of motion.      Cervical back: Normal range of motion and neck supple.   Skin:     General: Skin is warm and dry.      Capillary Refill: Capillary refill takes less than 2 seconds.      Coloration: Skin is not pale.      Findings: No erythema or rash.   Neurological:      Mental Status: She is alert and oriented to person, place, and time.      Cranial Nerves: No cranial nerve deficit.       Sensory: No sensory deficit.      Motor: No abnormal muscle tone.      Coordination: Coordination normal.   Psychiatric:         Behavior: Behavior normal.         Thought Content: Thought content normal.         Judgment: Judgment normal.         Procedures           ED Course                                           Medical Decision Making  Pneumonia of left lower lobe due to infectious organism: acute illness or injury  Amount and/or Complexity of Data Reviewed  Labs:      Details: labs reviewed  Radiology: ordered and independent interpretation performed.     Details: X-ray has a lucency at the left lower base, given 1 week of symptoms I will treat with an antibiotic.      Risk  Prescription drug management.          Final diagnoses:   Pneumonia of left lower lobe due to infectious organism       ED Disposition  ED Disposition     ED Disposition   Discharge    Condition   Stable    Comment   --             Your Doctor               Medication List      New Prescriptions    azithromycin 250 MG tablet  Commonly known as: ZITHROMAX  Take 1 tablet by mouth Daily.           Where to Get Your Medications      These medications were sent to [id number] - [place] - [address] - [phone]  - [phone] FX  [address], [place] [address]    Phone: [phone]   · azithromycin 250 MG tablet

## 2023-04-12 NOTE — DISCHARGE INSTRUCTIONS
Follow the instructions given and use the medication as directed.  Use Tylenol Motrin as directed for fevers and body aches.  Return immediately to the ER with any worsening fevers, chest pains, shortness of breath or any other concerns.

## 2025-07-13 PROCEDURE — 81001 URINALYSIS AUTO W/SCOPE: CPT

## 2025-07-13 PROCEDURE — 99284 EMERGENCY DEPT VISIT MOD MDM: CPT

## 2025-07-13 PROCEDURE — 81025 URINE PREGNANCY TEST: CPT | Performed by: EMERGENCY MEDICINE

## 2025-07-14 ENCOUNTER — APPOINTMENT (OUTPATIENT)
Dept: CT IMAGING | Facility: HOSPITAL | Age: 45
End: 2025-07-14
Payer: COMMERCIAL

## 2025-07-14 ENCOUNTER — HOSPITAL ENCOUNTER (EMERGENCY)
Facility: HOSPITAL | Age: 45
Discharge: HOME | End: 2025-07-14
Attending: EMERGENCY MEDICINE | Admitting: EMERGENCY MEDICINE
Payer: COMMERCIAL

## 2025-07-14 VITALS
SYSTOLIC BLOOD PRESSURE: 128 MMHG | WEIGHT: 190.7 LBS | OXYGEN SATURATION: 98 % | RESPIRATION RATE: 18 BRPM | TEMPERATURE: 97.5 F | HEART RATE: 59 BPM | HEIGHT: 61 IN | BODY MASS INDEX: 36 KG/M2 | DIASTOLIC BLOOD PRESSURE: 76 MMHG

## 2025-07-14 DIAGNOSIS — R82.998 LEUKOCYTES IN URINE: Primary | ICD-10-CM

## 2025-07-14 LAB
B-HCG UR QL: NEGATIVE
BACTERIA UR QL AUTO: ABNORMAL /HPF
BILIRUB UR QL STRIP: ABNORMAL
CLARITY UR: CLEAR
COLOR UR: ABNORMAL
GLUCOSE UR STRIP-MCNC: ABNORMAL MG/DL
HGB UR QL STRIP.AUTO: ABNORMAL
HYALINE CASTS UR QL AUTO: ABNORMAL /LPF
KETONES UR QL STRIP: ABNORMAL
LEUKOCYTE ESTERASE UR QL STRIP.AUTO: ABNORMAL
NITRITE UR QL STRIP: ABNORMAL
PH UR STRIP.AUTO: ABNORMAL [PH]
PROT UR QL STRIP: ABNORMAL
RBC # UR STRIP: ABNORMAL /HPF
REF LAB TEST METHOD: ABNORMAL
SP GR UR STRIP: 1.01 (ref 1–1.03)
SQUAMOUS #/AREA URNS HPF: ABNORMAL /HPF
UROBILINOGEN UR QL STRIP: ABNORMAL
WBC # UR STRIP: ABNORMAL /HPF

## 2025-07-14 PROCEDURE — 74176 CT ABD & PELVIS W/O CONTRAST: CPT

## 2025-07-14 RX ORDER — HYDROCODONE BITARTRATE AND ACETAMINOPHEN 5; 325 MG/1; MG/1
1 TABLET ORAL ONCE
Refills: 0 | Status: COMPLETED | OUTPATIENT
Start: 2025-07-14 | End: 2025-07-14

## 2025-07-14 RX ORDER — PHENAZOPYRIDINE HYDROCHLORIDE 200 MG/1
200 TABLET, FILM COATED ORAL 3 TIMES DAILY
Qty: 6 TABLET | Refills: 0 | Status: SHIPPED | OUTPATIENT
Start: 2025-07-14 | End: 2025-07-16

## 2025-07-14 RX ORDER — NITROFURANTOIN 25; 75 MG/1; MG/1
100 CAPSULE ORAL ONCE
Status: COMPLETED | OUTPATIENT
Start: 2025-07-14 | End: 2025-07-14

## 2025-07-14 RX ORDER — NITROFURANTOIN 25; 75 MG/1; MG/1
100 CAPSULE ORAL 2 TIMES DAILY
Qty: 14 CAPSULE | Refills: 0 | Status: SHIPPED | OUTPATIENT
Start: 2025-07-14 | End: 2025-07-21

## 2025-07-14 RX ADMIN — HYDROCODONE BITARTRATE AND ACETAMINOPHEN 1 TABLET: 5; 325 TABLET ORAL at 00:43

## 2025-07-14 RX ADMIN — NITROFURANTOIN (MONOHYDRATE/MACROCRYSTALS) 100 MG: 25; 75 CAPSULE ORAL at 00:43

## 2025-07-14 NOTE — Clinical Note
[facility] EMERGENCY ROOM  [address] [place] [address]  Phone: [phone]  Fax: [phone]    Latonya Lemus was seen and treated in our emergency department on 7/13/2025.  She may return to work on 07/15/2025.         Thank you for choosing [facility].    Mj Hernández, RN

## 2025-07-14 NOTE — ED PROVIDER NOTES
Time: 11:46 PM EDT  Date of encounter:  2025  Independent Historian/Clinical History and Information was obtained by:   Patient    History is limited by: N/A    Chief Complaint: UTI symptoms      History of Present Illness:  Patient is a 45 y.o. year old female who presents to the emergency department for evaluation of dysuria, urinary frequency, urgency ongoing for a week.  Took some Azo and symptoms resolved and restarted.  Also having midline low back pain.  Denies lower abdominal pain, flank pain, fevers, nausea or vomiting      Patient Care Team  Primary Care Provider: Provider, No Known    Past Medical History:     Allergies   Allergen Reactions    Imitrex [Sumatriptan] Shortness Of Breath    Latex Hives     Past Medical History:   Diagnosis Date    Migraine     Migraines     Strep throat      Past Surgical History:   Procedure Laterality Date     SECTION      x 3      TUBAL ABDOMINAL LIGATION       Family History   Problem Relation Age of Onset    COPD Maternal Grandmother     Heart attack Maternal Grandfather        Home Medications:  Prior to Admission medications    Medication Sig Start Date End Date Taking? Authorizing Provider   azithromycin (ZITHROMAX) 250 MG tablet Take 1 tablet by mouth Daily. 23   Jadiel Garcia DO   IBUPROFEN PO Take  by mouth.    Provider, Historical, MD        Social History:   Social History     Tobacco Use    Smoking status: Never    Smokeless tobacco: Never   Substance Use Topics    Alcohol use: Yes     Comment: socially    Drug use: No         Review of Systems:  Review of Systems   Constitutional:  Negative for chills and fever.   HENT:  Negative for congestion, ear pain and sore throat.    Eyes:  Negative for pain.   Respiratory:  Negative for cough, chest tightness and shortness of breath.    Cardiovascular:  Negative for chest pain.   Gastrointestinal:  Negative for abdominal pain, diarrhea, nausea and vomiting.   Genitourinary:  Positive for dysuria,  "frequency and urgency. Negative for flank pain and hematuria.   Musculoskeletal:  Positive for back pain. Negative for joint swelling.   Skin:  Negative for pallor.   Neurological:  Negative for seizures and headaches.   Psychiatric/Behavioral: Negative.     All other systems reviewed and are negative.       Physical Exam:  /93 (BP Location: Left arm, Patient Position: Sitting)   Pulse 61   Temp 97.5 °F (36.4 °C) (Oral)   Resp 18   Ht 154.9 cm (61\")   Wt 86.5 kg (190 lb 11.2 oz)   SpO2 98%   BMI 36.03 kg/m²     Physical Exam  Vitals and nursing note reviewed.   Constitutional:       General: She is not in acute distress.     Appearance: Normal appearance. She is not toxic-appearing.   HENT:      Head: Normocephalic and atraumatic.      Mouth/Throat:      Mouth: Mucous membranes are moist.   Eyes:      General: No scleral icterus.     Conjunctiva/sclera: Conjunctivae normal.   Cardiovascular:      Rate and Rhythm: Normal rate and regular rhythm.      Heart sounds: Normal heart sounds.   Pulmonary:      Effort: Pulmonary effort is normal. No respiratory distress.      Breath sounds: Normal breath sounds.   Abdominal:      General: Bowel sounds are normal. There is no distension.      Palpations: Abdomen is soft.      Tenderness: There is no abdominal tenderness. There is no right CVA tenderness or left CVA tenderness.   Musculoskeletal:         General: Tenderness (Left lower back pain) present. Normal range of motion.      Cervical back: Normal range of motion and neck supple.   Skin:     General: Skin is warm and dry.   Neurological:      General: No focal deficit present.      Mental Status: She is alert and oriented to person, place, and time. Mental status is at baseline.   Psychiatric:         Mood and Affect: Mood normal.         Behavior: Behavior normal.            Medical Decision Making:      Comorbidities that affect care:    Migraine    External Notes reviewed:    Previous ED Note: Patient " was seen and evaluated at a different emergency department back in June on the second for dizziness and hypokalemia      The following orders were placed and all results were independently analyzed by me:  Orders Placed This Encounter   Procedures    CT Abdomen Pelvis Stone Protocol    Urinalysis With Microscopic If Indicated (No Culture) - Urine, Clean Catch    Urinalysis, Microscopic Only - Urine, Clean Catch    Pregnancy, Urine - Urine, Clean Catch       Medications Given in the Emergency Department:  Medications   nitrofurantoin (macrocrystal-monohydrate) (MACROBID) capsule 100 mg (100 mg Oral Given 7/14/25 0043)   HYDROcodone-acetaminophen (NORCO) 5-325 MG per tablet 1 tablet (1 tablet Oral Given 7/14/25 0043)        ED Course:    ED Course as of 07/14/25 0115   Sun Jul 13, 2025   2346 --- PROVIDER IN TRIAGE NOTE ---    The patient was evaluated by Darlyn jain in triage. Orders were placed and the patient is currently awaiting disposition.    [AJ]   Mon Jul 14, 2025   0111 CT Abdomen Pelvis Stone Protocol  Cystitis [DS]      ED Course User Index  [AJ] Darlyn Cerna PA-C  [DS] Denise Anderson APRN       Labs:    Lab Results (last 24 hours)       Procedure Component Value Units Date/Time    Urinalysis With Microscopic If Indicated (No Culture) - Urine, Clean Catch [[id number]]  (Abnormal) Collected: 07/13/25 2355    Specimen: Urine, Clean Catch Updated: 07/14/25 0021     Color, UA [place]     Appearance, UA Clear     pH, UA --     Comment: Result not available due to interfering substances.        Specific Gravity, UA 1.010     Glucose, UA --     Comment: Result not available due to interfering substances.        Ketones, UA --     Comment: Result not available due to interfering substances.        Bilirubin, UA --     Comment: Result not available due to interfering substances.        Blood, UA --     Comment: Result not available due to interfering substances.        Protein, UA --     Comment:  Result not available due to interfering substances.        Leuk Esterase, UA --     Comment: Result not available due to interfering substances.        Nitrite, UA --     Comment: Result not available due to interfering substances.        Urobilinogen, UA --     Comment: Result not available due to interfering substances.       Urinalysis, Microscopic Only - Urine, Clean Catch [[id number]]  (Abnormal) Collected: 07/13/25 2355    Specimen: Urine, Clean Catch Updated: 07/14/25 0019     RBC, UA 0-2 /HPF      WBC, UA 3-5 /HPF      Bacteria, UA None Seen /HPF      Squamous Epithelial Cells, UA 3-6 /HPF      Hyaline Casts, UA None Seen /LPF      Methodology Automated Microscopy    Pregnancy, Urine - Urine, Clean Catch [[id number]]  (Normal) Collected: 07/13/25 2355    Specimen: Urine, Clean Catch Updated: 07/14/25 0040     HCG, Urine QL Negative    Narrative:      Sensitive immunoassays may demonstrate false positive results  with specimens containing heterophilic antibodies. Assays may  also exhibit false-positive or false-negative results with  specimens containing human anti-mouse antibodies. These   specimens may come from patients receving preparations of  mouse monoclonal antibodies for diagnosis or therapy or having  been exposed to mice. If the qualitative interpretation is  inconsistent with the clinical evaluation, results should be   confirmed by an alternate hCG method, ie. quantitative hCG.  As with all urine pregnancy test, this test does not reliably  detect hCG degradation products, including free-beta hCG and  beta core fragments.             Imaging:    CT Abdomen Pelvis Stone Protocol  Result Date: 7/14/2025  CT ABDOMEN PELVIS STONE PROTOCOL-  Date of exam: 7/14/2025 12:37 AM.  Comparison: 7/17/2023 (unavailable).  INDICATIONS: 45-year-old female w/ h/o the left lower back pain (LBP); dysuria; r82.998-other abnormal findings in urine.  TECHNIQUE: Axial CT images were obtained of the abdomen and pelvis  without the administration of contrast. Reconstructed 2D coronal and sagittal images were also obtained. No oral contrast agent was administered for the study. Automated exposure control and iterative construction methods were used. Additionally, the radiation dose reduction device was turned on for each scan per the ALARA (As Low as Reasonably Achievable) protocol. Please see the electronic medical record, EMR (i.e., Epic), for the documented radiation dose.  FINDINGS: The patient has undergone bilateral tubal ligation. The uterus is anteverted and slightly retroflexed. Grossly, no suspicious uterine or adnexal mass by nonenhanced CT. An age-indeterminate infectious/inflammatory cystitis is possible. No urinary bladder calculi are seen. There is nonobstructing right nephrolithiasis. No definite left nephrolithiasis. No ureterolithiasis. No hydronephrosis. Colonic diverticula are seen without acute diverticulitis. No acute appendicitis. No mechanical bowel obstruction. No pneumoperitoneum. No gallstones. No acute cholecystitis. No acute pancreatitis. The lung bases are clear of acute infiltrate. No aggressive osseous lesion. No acute fracture. There is a tiny fat-containing umbilical hernia. It does not contain bowel.       Possible age-indeterminate infectious/inflammatory cystitis. Otherwise, no acute findings are suggested by nonenhanced CT of the abdomen and pelvis. Please see above comments for further detail.    Portions of this note were completed with a voice recognition program.  7/14/2025 1:01 AM by Akin Melo MD on Workstation: [id number]          Differential Diagnosis and Discussion:    Dysuria: Differential diagnosis includes but is not limited to urethritis, cystitis, pyelonephritis, ureteral calculi, neoplasm, chemical irritant, urethral stricture, and trauma    PROCEDURES:    Labs were collected in the emergency department and all labs were reviewed and interpreted by me.  CT scan was performed in  the emergency department and the CT scan radiology impression was interpreted by me.    No orders to display       Procedures    MDM  Number of Diagnoses or Management Options  Leukocytes in urine  Diagnosis management comments: Based on the results of the patient's urinalysis and urinary complaints, signs, symptoms, and diagnostic testing is consistent with a urinary tract infection. Patient is resting comfortably, is alert, and is in no distress. The repeat examination is unremarkable and benign. The patient has no signs of urosepsis. The patient was started on antibiotics in the emergency department and will be discharged with antibiotics as an outpatient. The patient was counseled to return to the ER for fever >100.5, intractable pain or vomiting, or any other concerns that the may have. The patient has expressed a clear and thorough understanding and agreed to follow up as instructed.       Amount and/or Complexity of Data Reviewed  Clinical lab tests: reviewed and ordered  Tests in the radiology section of CPT®: reviewed and ordered  Tests in the medicine section of CPT®: ordered and reviewed    Risk of Complications, Morbidity, and/or Mortality  Presenting problems: moderate  Diagnostic procedures: low  Management options: low    Patient Progress  Patient progress: stable       Patient Care Considerations:    SEPSIS was considered but is NOT present in the emergency department as SIRS criteria is not present.      Consultants/Shared Management Plan:    None    Social Determinants of Health:    Patient has presented with family members who are responsible, reliable and will ensure follow up care.      Disposition and Care Coordination:    Discharged: I considered escalation of care by admitting this patient to the hospital, however patient has no findings of pyelonephritis or acute obstructive uropathy.  Patient has leukocytes in the urine and findings of cystitis on CT and will be treatedand does not need  continued inpatient care    I have explained the patient´s condition, diagnoses and treatment plan based on the information available to me at this time. I have answered questions and addressed any concerns. The patient has a good  understanding of the patient´s diagnosis, condition, and treatment plan as can be expected at this point. The vital signs have been stable. The patient´s condition is stable and appropriate for discharge from the emergency department.      The patient will pursue further outpatient evaluation with the primary care physician or other designated or consulting physician as outlined in the discharge instructions. They are agreeable to this plan of care and follow-up instructions have been explained in detail. The patient has received these instructions in written format and has expressed an understanding of the discharge instructions. The patient is aware that any significant change in condition or worsening of symptoms should prompt an immediate return to this or the closest emergency department or call to 911.  I have explained discharge medications and the need for follow up with the patient/caretakers. This was also printed in the discharge instructions. Patient was discharged with the following medications and follow up:      Medication List        New Prescriptions      nitrofurantoin (macrocrystal-monohydrate) 100 MG capsule  Commonly known as: MACROBID  Take 1 capsule by mouth 2 (Two) Times a Day for 7 days.     phenazopyridine 200 MG tablet  Commonly known as: PYRIDIUM  Take 1 tablet by mouth 3 (Three) Times a Day for 2 days.               Where to Get Your Medications        These medications were sent to [facility]/pharmacy #[id number] - [place], [place] - [address] - [phone] [facility] [phone]   [address] [place] [address]      Hours: 24-hours Phone: [phone]   nitrofurantoin (macrocrystal-monohydrate) 100 MG capsule  phenazopyridine 200 MG tablet      Provider,  No Known  [facility] [id number]    In 1 week         Final diagnoses:   Leukocytes in urine        ED Disposition       ED Disposition   Discharge    Condition   Stable    Comment   --               This medical record created using voice recognition software.             Denise Anderson, APRN  07/14/25 0116

## 2025-07-14 NOTE — DISCHARGE INSTRUCTIONS
Your urine had leukocytes in it which we discussed and your CT shows inflammation of the bladder consistent with cystitis which is a urinary tract infection or bladder infection.    Drink plenty of fluids.    Take medications as prescribed.    Follow-up with your PCP